# Patient Record
Sex: MALE | Race: WHITE
[De-identification: names, ages, dates, MRNs, and addresses within clinical notes are randomized per-mention and may not be internally consistent; named-entity substitution may affect disease eponyms.]

---

## 2018-04-29 ENCOUNTER — HOSPITAL ENCOUNTER (EMERGENCY)
Dept: HOSPITAL 47 - EC | Age: 73
Discharge: HOME | End: 2018-04-29
Payer: MEDICARE

## 2018-04-29 VITALS
RESPIRATION RATE: 18 BRPM | SYSTOLIC BLOOD PRESSURE: 176 MMHG | HEART RATE: 66 BPM | TEMPERATURE: 97 F | DIASTOLIC BLOOD PRESSURE: 96 MMHG

## 2018-04-29 DIAGNOSIS — Z87.891: ICD-10-CM

## 2018-04-29 DIAGNOSIS — N40.0: ICD-10-CM

## 2018-04-29 DIAGNOSIS — R33.9: Primary | ICD-10-CM

## 2018-04-29 DIAGNOSIS — Z98.890: ICD-10-CM

## 2018-04-29 LAB
PH UR: 6.5 [PH] (ref 5–8)
SP GR UR: 1.01 (ref 1–1.03)
UROBILINOGEN UR QL STRIP: <2 MG/DL (ref ?–2)

## 2018-04-29 PROCEDURE — 99284 EMERGENCY DEPT VISIT MOD MDM: CPT

## 2018-04-29 PROCEDURE — 81003 URINALYSIS AUTO W/O SCOPE: CPT

## 2018-04-29 PROCEDURE — 51702 INSERT TEMP BLADDER CATH: CPT

## 2018-04-29 NOTE — ED
Male Urogenital HPI





- General


Chief complaint: Urogenital


Stated complaint: Male 


Time Seen by Provider: 04/29/18 04:43


Source: patient


Mode of arrival: ambulatory


Limitations: no limitations





- History of Present Illness


Initial comments: 


73 years old male presents with urinary retention like symptoms he said he had 

a hard time voiding for the last 2 days he denies any history of prostate 

cancer or bladder tumor he said he has just a benign prostate hypertrophy 

complains about some mom frequency unable to void no fever no chills no nausea 

no vomiting no flank pain review of system is otherwise unremarkable








- Related Data


 Allergies











Allergy/AdvReac Type Severity Reaction Status Date / Time


 


No Known Allergies Allergy   Verified 04/29/18 04:31














Review of Systems


ROS Statement: 


Those systems with pertinent positive or pertinent negative responses have been 

documented in the HPI.





ROS Other: All systems not noted in ROS Statement are negative.





Past Medical History


Past Medical History: Diabetes Mellitus, Hypertension


History of Any Multi-Drug Resistant Organisms: None Reported


Past Surgical History: Back Surgery, Joint Replacement, Orthopedic Surgery, 

Tonsillectomy


Additional Past Surgical History / Comment(s): sinus surgery 9/28/2015,


Past Psychological History: No Psychological Hx Reported


Smoking Status: Former smoker


Past Alcohol Use History: None Reported


Past Drug Use History: None Reported





General Exam





- General Exam Comments


Initial Comments: 


General:  The patient is awake and alert, in no distress, and does not appear 

acutely ill. 


Skin:  Skin is warm and dry and no rashes or lesions are noted. 


Eye:  Pupils are equal, round and reactive to light, extra-ocular movements are 

intact; there is normal conjunctiva bilaterally.  


Ears, nose, mouth and throat:  There are moist mucous membranes and no oral 

lesions. 


Neck:  The neck is supple, there is no tenderness  or JVD.  


Cardiovascular:  There is a regular rate and rhythm. No murmur, rub or gallop 

is appreciated.


Respiratory: To auscultation bilateral, no wheezing no rhonchi no distress  

respiratory wise noticed


Gastrointestinal:  Soft, non-distended, non-tender abdomen without masses or 

organomegaly noted. There is no rebound or guarding present. Bowel sounds are 

unremarkable. 


Back:  There is no tenderness to palpation in the midline. There is no obvious 

deformity.


Musculoskeletal:  Normal ROM, no tenderness, There is no pedal edema. There is 

no calf tenderness or swelling. No cords were appreciated.  


Neurological:  CN II-XII intact, Cranial nerves III through XII are intact. 

There are no obvious motor or sensory deficits. Coordination appears grossly 

intact. Speech is normal.


Psychiatric:  Cooperative, appropriate mood & affect, normal judgment.  








Limitations: no limitations





Course





 Vital Signs











  04/29/18





  04:26


 


Temperature 97.9 F


 


Pulse Rate 84


 


Respiratory 20





Rate 


 


Blood Pressure 189/99


 


O2 Sat by Pulse 98





Oximetry 








Urinalysis was done it was unremarkable, bladder scan was done it started about 

600 mL of urine Andujar was inserted, 400+ cc came out patient is inclined to 

keep the Andujar catheter he has his urologist at [facility] and he wants 

to follow up with him or provide him with a leg bag and he be discharged to see 

his family doctor and his urologist





Medical Decision Making





- Lab Data





 Lab Results











  04/29/18 Range/Units





  04:58 


 


Urine Color  Light Yellow  


 


Urine Appearance  Clear  (Clear)  


 


Urine pH  6.5  (5.0-8.0)  


 


Ur Specific Gravity  1.008  (1.001-1.035)  


 


Urine Protein  Negative  (Negative)  


 


Urine Glucose (UA)  Negative  (Negative)  


 


Urine Ketones  Negative  (Negative)  


 


Urine Blood  Negative  (Negative)  


 


Urine Nitrite  Negative  (Negative)  


 


Urine Bilirubin  Negative  (Negative)  


 


Urine Urobilinogen  <2.0  (<2.0)  mg/dL


 


Ur Leukocyte Esterase  Negative  (Negative)  














Disposition


Clinical Impression: 


 Urinary retention





Disposition: HOME SELF-CARE


Condition: Good


Instructions:  Urinary Retention in Men (ED)


Is patient prescribed a controlled substance at d/c from ED?: No


If prescribed controlled substance>3 days was MAPS reviewed?: No


When asked, does pt state using other controlled substances?: No


Referrals: 


Osvaldo Schreiber MD [Primary Care Provider] - 1-2 days